# Patient Record
Sex: MALE | Race: WHITE | Employment: FULL TIME
[De-identification: names, ages, dates, MRNs, and addresses within clinical notes are randomized per-mention and may not be internally consistent; named-entity substitution may affect disease eponyms.]

---

## 2017-04-13 PROBLEM — K40.90 INGUINAL HERNIA OF LEFT SIDE WITHOUT OBSTRUCTION OR GANGRENE: Status: ACTIVE | Noted: 2017-04-13

## 2018-08-02 ENCOUNTER — SURGERY (OUTPATIENT)
Age: 48
End: 2018-08-02

## 2018-08-02 ENCOUNTER — ANESTHESIA (OUTPATIENT)
Dept: SURGERY | Facility: HOSPITAL | Age: 48
End: 2018-08-02

## 2018-08-02 ENCOUNTER — HOSPITAL ENCOUNTER (OUTPATIENT)
Facility: HOSPITAL | Age: 48
Setting detail: HOSPITAL OUTPATIENT SURGERY
Discharge: HOME | End: 2018-08-02
Attending: SURGERY | Admitting: SURGERY
Payer: COMMERCIAL

## 2018-08-02 ENCOUNTER — ANESTHESIA EVENT (OUTPATIENT)
Dept: SURGERY | Facility: HOSPITAL | Age: 48
End: 2018-08-02

## 2018-08-02 VITALS
WEIGHT: 169.06 LBS | BODY MASS INDEX: 22.9 KG/M2 | OXYGEN SATURATION: 97 % | RESPIRATION RATE: 16 BRPM | HEART RATE: 49 BPM | HEIGHT: 72 IN | SYSTOLIC BLOOD PRESSURE: 115 MMHG | DIASTOLIC BLOOD PRESSURE: 74 MMHG | TEMPERATURE: 98 F

## 2018-08-02 DIAGNOSIS — K40.90 INGUINAL HERNIA OF LEFT SIDE WITHOUT OBSTRUCTION OR GANGRENE: ICD-10-CM

## 2018-08-02 PROCEDURE — 8E0W4CZ ROBOTIC ASSISTED PROCEDURE OF TRUNK REGION, PERCUTANEOUS ENDOSCOPIC APPROACH: ICD-10-PCS | Performed by: SURGERY

## 2018-08-02 PROCEDURE — 0YU64JZ SUPPLEMENT LEFT INGUINAL REGION WITH SYNTHETIC SUBSTITUTE, PERCUTANEOUS ENDOSCOPIC APPROACH: ICD-10-PCS | Performed by: SURGERY

## 2018-08-02 DEVICE — PROGRIP MESH: Type: IMPLANTABLE DEVICE | Site: INGUINAL | Status: FUNCTIONAL

## 2018-08-02 RX ORDER — LIDOCAINE HYDROCHLORIDE 10 MG/ML
INJECTION, SOLUTION INFILTRATION; PERINEURAL AS NEEDED
Status: DISCONTINUED | OUTPATIENT
Start: 2018-08-02 | End: 2018-08-02 | Stop reason: HOSPADM

## 2018-08-02 RX ORDER — BUPIVACAINE HYDROCHLORIDE 5 MG/ML
INJECTION, SOLUTION EPIDURAL; INTRACAUDAL AS NEEDED
Status: DISCONTINUED | OUTPATIENT
Start: 2018-08-02 | End: 2018-08-02 | Stop reason: HOSPADM

## 2018-08-02 RX ORDER — MEPERIDINE HYDROCHLORIDE 25 MG/ML
12.5 INJECTION INTRAMUSCULAR; INTRAVENOUS; SUBCUTANEOUS AS NEEDED
Status: DISCONTINUED | OUTPATIENT
Start: 2018-08-02 | End: 2018-08-02

## 2018-08-02 RX ORDER — ACETAMINOPHEN 500 MG
1000 TABLET ORAL ONCE
Status: DISCONTINUED | OUTPATIENT
Start: 2018-08-02 | End: 2018-08-02 | Stop reason: HOSPADM

## 2018-08-02 RX ORDER — HYDROCODONE BITARTRATE AND ACETAMINOPHEN 5; 325 MG/1; MG/1
1 TABLET ORAL AS NEEDED
Status: COMPLETED | OUTPATIENT
Start: 2018-08-02 | End: 2018-08-02

## 2018-08-02 RX ORDER — MORPHINE SULFATE 4 MG/ML
2 INJECTION, SOLUTION INTRAMUSCULAR; INTRAVENOUS EVERY 5 MIN PRN
Status: DISCONTINUED | OUTPATIENT
Start: 2018-08-02 | End: 2018-08-02

## 2018-08-02 RX ORDER — NALOXONE HYDROCHLORIDE 0.4 MG/ML
80 INJECTION, SOLUTION INTRAMUSCULAR; INTRAVENOUS; SUBCUTANEOUS AS NEEDED
Status: DISCONTINUED | OUTPATIENT
Start: 2018-08-02 | End: 2018-08-02

## 2018-08-02 RX ORDER — SODIUM CHLORIDE, SODIUM LACTATE, POTASSIUM CHLORIDE, CALCIUM CHLORIDE 600; 310; 30; 20 MG/100ML; MG/100ML; MG/100ML; MG/100ML
INJECTION, SOLUTION INTRAVENOUS CONTINUOUS
Status: DISCONTINUED | OUTPATIENT
Start: 2018-08-02 | End: 2018-08-02

## 2018-08-02 RX ORDER — MIDAZOLAM HYDROCHLORIDE 1 MG/ML
1 INJECTION INTRAMUSCULAR; INTRAVENOUS EVERY 5 MIN PRN
Status: DISCONTINUED | OUTPATIENT
Start: 2018-08-02 | End: 2018-08-02

## 2018-08-02 RX ORDER — ONDANSETRON 2 MG/ML
4 INJECTION INTRAMUSCULAR; INTRAVENOUS AS NEEDED
Status: DISCONTINUED | OUTPATIENT
Start: 2018-08-02 | End: 2018-08-02

## 2018-08-02 RX ORDER — ACETAMINOPHEN 500 MG
1000 TABLET ORAL ONCE
COMMUNITY

## 2018-08-02 RX ORDER — CEFAZOLIN SODIUM/WATER 2 G/20 ML
2 SYRINGE (ML) INTRAVENOUS ONCE
Status: COMPLETED | OUTPATIENT
Start: 2018-08-02 | End: 2018-08-02

## 2018-08-02 RX ORDER — IBUPROFEN 800 MG/1
800 TABLET ORAL EVERY 8 HOURS PRN
Qty: 42 TABLET | Refills: 0 | Status: SHIPPED | OUTPATIENT
Start: 2018-08-02 | End: 2018-08-09

## 2018-08-02 RX ORDER — HYDROCODONE BITARTRATE AND ACETAMINOPHEN 5; 325 MG/1; MG/1
2 TABLET ORAL AS NEEDED
Status: COMPLETED | OUTPATIENT
Start: 2018-08-02 | End: 2018-08-02

## 2018-08-02 RX ORDER — DIPHENHYDRAMINE HYDROCHLORIDE 50 MG/ML
12.5 INJECTION INTRAMUSCULAR; INTRAVENOUS AS NEEDED
Status: DISCONTINUED | OUTPATIENT
Start: 2018-08-02 | End: 2018-08-02

## 2018-08-02 NOTE — BRIEF OP NOTE
Pre-Operative Diagnosis: Inguinal hernia of left side without obstruction or gangrene [K40.90]     Post-Operative Diagnosis: Inguinal hernia of left side without obstruction or gangrene [K40.90]      Procedure Performed:   Procedure(s):  ROBOTIC ASSISTED R

## 2018-08-02 NOTE — OPERATIVE REPORT
[facility]                                                         Operative Note    Luis Degroot Location: [address] Perioperative   CSN [id number] MRN [id number]   Admission Date 8/2/2018 Procedure Date 8/2/2018   Attending Physician Augustus Garcia to the abdominal cavity and placed in position. The mesh provided coverage of the direct indirect and lateral space. Once this was accomplished the peritoneum was brought back up into position and reapproximated with a 3-0 V. LOC suture.  Hemostasis was ach

## 2018-08-02 NOTE — ANESTHESIA PREPROCEDURE EVALUATION
PRE-OP EVALUATION    Patient Name: Ramirez Escobar    Pre-op Diagnosis: Inguinal hernia of left side without obstruction or gangrene [K40.90]    Procedure(s):  ROBOTIC ASSISTED REPAIR LEFT INGUINAL HERNIA WITH MESH    Surgeon(s) and Role:     * Gra Cardiovascular    Cardiovascular exam normal.  Rhythm: regular  Rate: normal     Dental    No notable dental history. Pulmonary    Pulmonary exam normal.  Breath sounds clear to auscultation bilaterally.                Other findings            ASA:

## 2018-08-02 NOTE — H&P
Office Visit     4/17/2017  [facility]      Roman Collazo MD   SURGERY, GENERAL   Inguinal hernia, left   Dx   Consult ; Referred by Merlin Foreman, MD   Reason for Visit    Reason for Visit     Consult left inguinal hernia 4-5 weeks. ref by Dr. Sherri Ramos. ABDOMEN: normal active BS, soft, nondistended; no HSM, no masses. There is a reducible hernia present in the left inguinal region. I do not appreciate a hernia in the right inguinal region. The testes are descended.   LYMPHATIC: no lymphadenopathy  EXTREMITI

## 2020-02-10 PROBLEM — Z87.19 S/P HERNIA REPAIR: Status: ACTIVE | Noted: 2020-02-10

## 2020-02-10 PROBLEM — K40.90 INGUINAL HERNIA OF LEFT SIDE WITHOUT OBSTRUCTION OR GANGRENE: Status: RESOLVED | Noted: 2017-04-13 | Resolved: 2020-02-10

## 2020-02-10 PROBLEM — Z98.890 S/P HERNIA REPAIR: Status: ACTIVE | Noted: 2020-02-10

## 2021-05-06 PROBLEM — I25.10 ASHD (ARTERIOSCLEROTIC HEART DISEASE): Status: ACTIVE | Noted: 2021-05-06
